# Patient Record
Sex: FEMALE | Race: WHITE
[De-identification: names, ages, dates, MRNs, and addresses within clinical notes are randomized per-mention and may not be internally consistent; named-entity substitution may affect disease eponyms.]

---

## 2022-03-17 ENCOUNTER — DASHBOARD ENCOUNTERS (OUTPATIENT)
Age: 27
End: 2022-03-17

## 2022-03-17 ENCOUNTER — OFFICE VISIT (OUTPATIENT)
Dept: URBAN - METROPOLITAN AREA CLINIC 23 | Facility: CLINIC | Age: 27
End: 2022-03-17
Payer: COMMERCIAL

## 2022-03-17 ENCOUNTER — WEB ENCOUNTER (OUTPATIENT)
Dept: URBAN - METROPOLITAN AREA CLINIC 23 | Facility: CLINIC | Age: 27
End: 2022-03-17

## 2022-03-17 DIAGNOSIS — R74.01 ELEVATED TRANSAMINASE LEVEL: ICD-10-CM

## 2022-03-17 DIAGNOSIS — K80.50 BILIARY COLIC: ICD-10-CM

## 2022-03-17 DIAGNOSIS — K80.20 SYMPTOMATIC CHOLELITHIASIS: ICD-10-CM

## 2022-03-17 PROCEDURE — 99244 OFF/OP CNSLTJ NEW/EST MOD 40: CPT | Performed by: INTERNAL MEDICINE

## 2022-03-17 PROCEDURE — 99204 OFFICE O/P NEW MOD 45 MIN: CPT | Performed by: INTERNAL MEDICINE

## 2022-03-17 RX ORDER — LEVONORGESTREL AND ETHINYL ESTRADIOL 0.1-0.02MG
1 TABLET FOR 21 DAYS OF EACH CYCLE KIT ORAL ONCE A DAY
Status: ACTIVE | COMMUNITY

## 2022-03-17 NOTE — HPI-TODAY'S VISIT:
- 28 yo  female, referred by Dr. Rubia Briseno for further evaluation of GI complaints as detailed below, which have been ongoing for approximately .  A copy of this note will be sent to the referring physician. - Patient was recently seen in the emergency room at [facility] for a biliary colic.  I reviewed labs and imaging studies with the patient from [facility] and I do agree that she had a biliary colic.  She had mild elevation of her transaminases and had gallstones on CT without biliary ductal dilation.  Patient had previously stated that her liver function test were normal last month during a routine physical.  Denies alcohol abuse.  She drinks alcohol on rare social occasions. - The patient is also convinced that she had a biliary colic.  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  She wishes to have a cholecystectomy but she came to see me today to ensure she did not need an EGD first.  She denies any upper GI symptoms of heartburn, dyspepsia, dysphagia, nausea, or abdominal bloating.  Does not take NSAIDs.

## 2022-04-14 PROBLEM — 70342003 CHOLELITHIASIS WITHOUT OBSTRUCTION: Status: ACTIVE | Noted: 2022-04-14
